# Patient Record
(demographics unavailable — no encounter records)

---

## 2021-01-01 NOTE — PCM.PNNB
- General Info


Date of Service: 21





- Patient Data


Vital Signs: 


                                Last Vital Signs











Temp  37.0 C   21 08:00


 


Pulse  144   21 08:00


 


Resp  40   21 08:00


 


BP  82/49   21 14:45


 


Pulse Ox      











Weight: 3.17 kg


I&O Last 24 Hours: 


                                 Intake & Output











 21





 22:59 06:59 14:59


 


Intake Total  35 


 


Balance  35 











Labs Last 24 Hours: 


                         Laboratory Results - last 24 hr











  21 Range/Units





  14:14 14:58 15:45 


 


POC Glucose   33  53  (30-60)  mg/dL


 


Cord Blood Type  O POSITIVE    














  21 Range/Units





  17:46 20:40 01:59 


 


POC Glucose  45  61 H  74  (30-60)  mg/dL


 


Cord Blood Type     














  21 Range/Units





  04:49 08:14 


 


POC Glucose  55  59  (30-60)  mg/dL


 


Cord Blood Type    











Current Medications: 


                               Current Medications





Dextrose (Glucose Gel 15 Gm In 37.5 Gm Tube)  0 gm PO ONETIME PRN; Protocol


   PRN Reason: Hypoglycemia


   Last Admin: 21 15:08 Dose:  0.57 gm


   Documented by: 


Erythromycin (Erythromycin Base 0.5% Ophth Oint 1 Gm Tube)  1 gm EYEBOTH ONETIME

PRN


   PRN Reason: For Delivery


   Last Admin: 21 15:06 Dose:  1 gm


   Documented by: 


Phytonadione (Phytonadione 1 Mg/0.5 Ml Amp)  1 mg IM ONETIME PRN


   PRN Reason: For Delivery


   Last Admin: 21 15:04 Dose:  1 mg


   Documented by: 


Sucrose (Sucrose 24% Solution 15 Ml Vial)  15 ml PO ASDIRECTED PRN


   PRN Reason: Circumcision





Discontinued Medications





Hepatitis B Vaccine (Hepatitis B Virus Vaccine Pf (Pediatric) 10 Mcg/0.5 Ml 

Syringe)  10 mcg IM .ONCE ONE


   Stop: 21 14:34


   Last Admin: 21 15:05 Dose:  10 mcg


   Documented by: 











- General/Neuro


Activity: Sleeping, Active





- Exam


Eyes: Bilateral: Normal Inspection, Red Reflex, Positive


Ears: Normal Appearance, Symmetrical


Nose: Normal Inspection


Mouth: Nnormal Inspection, Palate Intact


Chest/Cardiovascular: Normal Appearance, Normal Peripheral Pulses, Regular Heart

Rate, Symmetrical, Clavicles Intact, Irregular Heart Rate (no), Murmur (no)


Respiratory: Lungs Clear, Normal Breath Sounds, No Respiratoy Distress


Abdomen/GI: Normal Bowel Sounds, No Mass, Symmetrical, Soft, Distended (no)


Genitalia (Female): Reports: Normal External Exam (Vigorous female infant with 

strong cry and normal tone. Exhibits developmentally and socially appropriate 

 behavior. )


Extremities: Normal Inspection, Normal Capillary Refill, Normal Range of Motion


Skin: Dry, Intact, Normal Color, Warm





- Subjective


Note: 





BG "Nicky" is doing well so far. She is breast feeding fairly well. She has 

been somewhat spitty and was suctioned for ~25 ml amniotic fluid overnight. She 

has stooled, but no void recorded yet. Initial glucose <40; she was treated with

glucose gel with satifactory results. Subsequent levels x 3 all over 50. No 

apparent risk factors for hypoglycemia, though mother is obese. 





- Problem List & Annotations


(1) Liveborn by 


SNOMED Code(s): 638237995


   Code(s): Z38.01 - SINGLE LIVEBORN INFANT, DELIVERED BY    Status: 

Acute   Current Visit: Yes   


Qualifiers: 


   Number of infants: carroll   Qualified Code(s): Z38.01 - Single liveborn 

infant, delivered by    


Annotation/Comment:: Clinically stable female infant with no apparent congenital

anomaly.    





- Problem List Review


Problem List Initiated/Reviewed/Updated: Yes





- My Orders


Last 24 Hours: 


My Active Orders





21 14:14


Patient Status [ADT] Routine 





21 14:33


Blood Glucose Check, Bedside [RC] ONETIME 


Communication Order [RC] ASDIRECTED 


Communication Order [RC] ASDIRECTED 


[place] Hearing Screen [RC] ROUTINE 


[place] Intake and Output [RC] QSHIFT 


Notify Provider [RC] PRN 


Oxygen Therapy [RC] ASDIRECTED 


Vital Measures, [place] [RC] Per Unit Routine 


Dextrose [Glutose 15]   See Protocol  PO ONETIME PRN 


Erythromycin Base [Erythromycin 0.5% Ophth Oint]   1 gm EYEBOTH ONETIME PRN 


Phytonadione [AquaMephyton]   1 mg IM ONETIME PRN 


Sucrose [Sweet-Ease Natural]   15 ml PO ASDIRECTED PRN 


Resuscitation Status Routine 





21 14:14


BILIRUBIN,  PROFILE [CHEM] Routine 


 SCREENING (STATE) [POC] Routine 














- Plan


Plan:: 





Routine  care and protocols.

## 2021-01-01 NOTE — PCM.NBADM
[facility] Nursery Information


Gestation Age (Weeks,Days): Weeks (39/3)


Sex, Infant: Female


Cry Description: Strong, Lusty


[place] Reflex: Normal Response


Suck Reflex: Normal Response


Bed Type: Radiant Warmer





 Physician Exam





- Exam


Exam: See Below


Activity: Active


Head: Face Symmetrical, Atraumatic, Normocephalic, [place] Soft, Sutures 

Overriding


Eyes: Bilateral: Normal Inspection (RR not visualized on today's examination)


Ears: Normal Appearance, Symmetrical


Nose: Normal Inspection


Mouth: Nnormal Inspection, Palate Intact


Neck: Normal Inspection, Trachea Midline, Neck Masses (no)


Chest/Cardiovascular: Normal Appearance, Normal Peripheral Pulses, Regular Heart

Rate, Symmetrical, Clavicles Intact (no), Irregular Heart Rate (no), Murmur (no)


Respiratory: Lungs Clear, Normal Breath Sounds, No Respiratoy Distress


Abdomen/GI: Normal Bowel Sounds, No Mass, Symmetrical, Soft, Distended (no), 

Other (No h/s'megaly. Normal appearing anus. )


Rectal: Normal Exam


Genitalia (Female): Normal External Exam


Spine/Skeletal: Normal Inspection, Normal Range of Motion, Crepitus, Left (no), 

Crepitus, Right (no), Hip Click, Left (no), Hip Click, Right (no), Sacral Dimple

(no), Sacral Sinus (no), Tuft or Hair (no)


Extremities: Normal Inspection, Normal Capillary Refill, Normal Range of Motion


Skin: Dry, Intact, Normal Color, Warm





 Assessment and Plan


(1) Liveborn by 


SNOMED Code(s): 646402460


   Code(s): Z38.01 - SINGLE LIVEBORN INFANT, DELIVERED BY    Status: 

Acute   Current Visit: Yes   


Qualifiers: 


   Number of infants: carroll   Qualified Code(s): Z38.01 - Single liveborn 

infant, delivered by    


Assessment:: 


Clinically stable female infant. Some spittiness, but overall nursing well. She 

has stooled; no void recorded yet. 





Problem List Initiated/Reviewed/Updated: Yes


Orders (Last 24 Hours): 


                               Active Orders 24 hr











 Category Date Time Status


 


 Patient Status [ADT] Routine ADT  21 14:14 Active


 


 Blood Glucose Check, Bedside [RC] ONETIME Care  21 14:33 Active


 


 Communication Order [RC] ASDIRECTED Care  21 14:33 Active


 


 Communication Order [RC] ASDIRECTED Care  21 14:33 Active


 


 [place] Hearing Screen [RC] ROUTINE Care  21 14:33 Active


 


  Intake and Output [RC] QSHIFT Care  21 14:33 Active


 


 Notify Provider [RC] PRN Care  21 14:33 Active


 


 Oxygen Therapy [RC] ASDIRECTED Care  21 14:33 Active


 


 Vaccines to be Administered [RC] PER UNIT ROUTINE Care  21 14:34 Active


 


 Vital Measures,  [RC] Per Unit Routine Care  21 14:33 Active


 


 BILIRUBIN,  PROFILE [CHEM] Routine Lab  21 14:14 Ordered


 


 CORD BLOOD TYPE [BBK] Routine Lab  21 14:14 Received


 


  SCREENING (STATE) [POC] Routine Lab  21 14:14 Ordered


 


 Dextrose [Glutose 15] Med  21 14:33 Active





 See Protocol  PO ONETIME PRN   


 


 Erythromycin Base [Erythromycin 0.5% Ophth Oint] Med  21 14:33 Active





 1 gm EYEBOTH ONETIME PRN   


 


 Phytonadione [AquaMephyton] Med  21 14:33 Active





 1 mg IM ONETIME PRN   


 


 Sucrose [Sweet-Ease Natural] Med  21 14:33 Active





 15 ml PO ASDIRECTED PRN   


 


 Resuscitation Status Routine Resus Stat  21 14:33 Ordered








                                Medication Orders





Dextrose (Glucose Gel 15 Gm In 37.5 Gm Tube)  0 gm PO ONETIME PRN; Protocol


   PRN Reason: Hypoglycemia


Erythromycin (Erythromycin Base 0.5% Ophth Oint 1 Gm Tube)  1 gm EYEBOTH ONETIME

PRN


   PRN Reason: For Delivery


Phytonadione (Phytonadione 1 Mg/0.5 Ml Amp)  1 mg IM ONETIME PRN


   PRN Reason: For Delivery


Sucrose (Sucrose 24% Solution 15 Ml Vial)  15 ml PO ASDIRECTED PRN


   PRN Reason: Circumcision








Plan: 





Routine  care and protocols. 





 History





- [facility] Admission Detail


Date of Service: 21


[facility] Admission Detail: 


Term female infant born at 39/3 weeks gestation by emergent  after 

failed attempt at version for lili breech presentation to a 23 yo G1 now P1 A+,

GBS negative mother. Uncomplicated surgery, baby initially limp and blue when ha

nded off to pediatrics but responded promptly to stimulation and drying and bulb

suctioning. APGAR's 8/9. Received routine  meds x 3. Baby is being 

exclusively breast fed. No void or stool yet. 


Infant Delivery Method: Emergent , Primary 


Infant Delivery Mode: Manual





- Maternal History


Mother's Blood Type: A


Mother's Rh: Positive


Maternal Hepatitis B: Negative


Maternal STD: Negative


Maternal HIV: Negative


Maternal Group Beta Strep/GBS: Negative


Maternal VDRL: Negative


Prenatal Care Received: Yes


Pregnancy Complications: Other (See Below) (Breech presentation)

## 2021-01-01 NOTE — PCM.NBDC
Discharge Summary





- Hospital Course


Free Text/Narrative: 


BG is clinically stable. She is nursing well with mother using breast shield, is

voiding and stooling normally. Received routine  meds x 3, passed CCHD 

and hearing screen, NB screen #1 collected. 24 hour bilirubin 5.4. FOB at 

bedside, supportive. 


BW 3.17 DW 2.90 % Loss: 9& BT O+. 





- Discharge Data


Date of Birth: 21


Delivery Time: 14:14


Discharge Disposition: Home, Self-Care 01


Condition: Stable





- Discharge Diagnosis/Problem(s)


(1) Liveborn by 


SNOMED Code(s): 977333497


   ICD Code: Z38.01 - SINGLE LIVEBORN INFANT, DELIVERED BY    Status: 

Acute   Current Visit: Yes   Problem Details: Clinically stable female infant 

with no apparent congenital anomaly.    


Qualifiers: 


   Number of infants: carroll   Qualified Code(s): Z38.01 - Single liveborn 

infant, delivered by    





- Discharge Plan


Instructions:  Jaundice, [place], Keeping Your [place] Safe and Healthy, 

Easy-to-Read, Well , [place], Well Child Development, , Well 

Child Nutrition, 0-3 Months Old


Referrals: 


Beatris Garcia PA [Physician Assistant] - 21 11:00 am (Please arrive 30 

minutes early to complete new patient paperwork. Masks are required.)





- Discharge Summary/Plan Comment


DC Time >30 min.: No


Discharge Summary/Plan:: 





Routine  care and follow-up. Mother may want to supplement with formula 

after breast feeding until her milk is in. Hips stable on examination now, but 

may require u/s or other f/u at ~ 2 months of age. Discussed w parents. 





[place] Discharge Instructions





- Discharge 


Diet: Breastfeeding, Formula (until breast milk is in)


Activity: Don't Co-Sleep w/Infant, Keep Away-Large Crowds, Keep Away-Sick 

People, Place on Back to Sleep


Notify Provider of: Fever Over 100.4 Rectally, Diarrhea Over Twice/Day, Forceful

Vomiting, Refuse 2 or More Feedings, Unusual Rashes, Persistent Crying, 

Persistent Irritability, New Jaundice Skin/Eyes, Worse Jaundice Skin/Eyes, No 

Wet Diaper Over 18 Hrs


Go to Emergency Department or Call 911 If: Difficulty Breathing, Infant is 

Lifeless, Infant is Limp, Skin Turns Blue in Color, Skin Turns Pale


Cord Care: Don't Submerge in Tub, Sponge Bathe Only, Leave Dry


Immunizations Given During Stay: Hepatitis B


OAE Results Left Ear: Pass


OAE Results Right Ear: Pass





[place] Nursery Info & Exam





- Exam


Exam: See Below





- Vital Signs


Vital Signs: 


                                Last Vital Signs











Temp  37.0 C   21 08:00


 


Pulse  144   21 08:00


 


Resp  40   21 08:00


 


BP  82/49   21 14:45


 


Pulse Ox      











[place] Birth Weight: 3170 kg


Current Weight: 2970 kg


Height: 50.8 cm





- Nursery Information


Sex, Infant: Female


Cry Description: Strong, Lusty


[place] Reflex: Normal Response


Suck Reflex: Normal Response


Head Circumference: 33.02 cm


Abdominal Girth: 33.02 cm


Bed Type: Open Crib





- General/Neuro


Activity: Sleeping, Active


Resting Posture: Flexion





- [place] Scoring


Neuro Posture, NB: Flexion All Limbs


Neuro Square Window: Wrist 30 Degrees


Neuro Arm Recoil: Arm Recoil  Degrees


Neuro Popliteal Angle: Popliteal Angle 90 Degrees


Neuro Scarf Sign: Elbow at Same Side


Neuro Heel to Ear: Knee Bent to 90 Heel Reaches 90 Degrees from Prone


Neuro Maturity Score: 19


Physical Skin: [place], Deep Cracking, No Vessels


Physical Lanugo: Bald Areas


Physical Plantar Surface: Creases Anterior 2/3


Physical Breast: Raised Areola, 3-4 mm Bud


Physical Eye/Ear: Formed and Firm, Instant Recoil


Physical Genitals - Female: Majora Large, Minora Small


Physical Maturity Score: 19


Maturity Ratin


[place] Additional Comments: 39 weeks





- Physical Exam


Head: Face Symmetrical, Atraumatic, Normocephalic, [place] Soft


Eyes: Bilateral: Normal Inspection, Red Reflex, Positive


Ears: Normal Appearance, Symmetrical


Nose: Normal Inspection


Mouth: Nnormal Inspection, Palate Intact


Neck: Normal Inspection, Trachea Midline, Neck Masses (no)


Chest/Cardiovascular: Normal Appearance, Normal Peripheral Pulses, Regular Heart

Rate, Irregular Heart Rate (no), Murmur (no. HM heard on examination yesterday 

has resolved. )


Respiratory: Lungs Clear, Normal Breath Sounds, No Respiratoy Distress


Abdomen/GI: Normal Bowel Sounds, No Mass, Symmetrical, Soft, Distended (no), 

Other (Normal anus. No h/s'megaly. )


Rectal: Normal Exam


Genitalia (Female): Normal External Exam


Spine/Skeletal: Normal Inspection, Normal Range of Motion, Crepitus, Left (no), 

Crepitus, Right (no), Hip Click, Left (no), Hip Click, Right (no), Sacral Dimple

(no), Sacral Sinus (no), Tuft or Hair (no)


Extremities: Normal Inspection, Normal Capillary Refill, Normal Range of Motion


Skin: Dry, Intact, Normal Color, Warm


Physical Findings:: 





Vigorous female infant with strong cry and normal tone. Exhibits developmentally

and socially appropriate  behavior. 





 POC Testing





- Congenital Heart Disease Screening


CCHD O2 Saturation, Right Hand: 100


CCHD O2 Saturation, Left Foot: 99


CCHD Screen Result: Pass





- Bilirubin Screening


Delivery Date: 21


Delivery Time: 14:14





[place] History





- [place] Admission Detail


Date of Service: 21


 Admission Detail: 





-  Admission Detail


Date of Service: 21


 Admission Detail: 


Term female infant born at 39/3 weeks gestation by emergent  after 

failed attempt at version for lili breech presentation to a 23 yo G1 now P1 A+,

GBS negative mother. Uncomplicated surgery, baby initially limp and blue when 

handed off to pediatrics but responded promptly to stimulation and drying and 

bulb suctioning. APGAR's 8/9. Received routine  meds x 3. Baby is being 

exclusively breast fed. No void or stool yet. 


Infant Delivery Method: Emergent , Primary 


Infant Delivery Mode: Manual


Infant Delivery Method: Emergent , Primary 


Infant Delivery Mode: Manual





- Maternal History


Mother's Blood Type: A


Mother's Rh: Positive


Maternal Hepatitis B: Negative


Maternal STD: Negative


Maternal HIV: Negative


Maternal Group Beta Strep/GBS: Negative


Maternal VDRL: Negative


Prenatal Care Received: Yes


Pregnancy Complications: Other (See Below) (Breech presentation)